# Patient Record
Sex: MALE | Race: WHITE | NOT HISPANIC OR LATINO | Employment: FULL TIME | URBAN - NONMETROPOLITAN AREA
[De-identification: names, ages, dates, MRNs, and addresses within clinical notes are randomized per-mention and may not be internally consistent; named-entity substitution may affect disease eponyms.]

---

## 2023-09-14 ENCOUNTER — HOSPITAL ENCOUNTER (EMERGENCY)
Facility: OTHER | Age: 58
Discharge: HOME | End: 2023-09-14
Attending: FAMILY MEDICINE | Admitting: FAMILY MEDICINE

## 2023-09-14 VITALS
TEMPERATURE: 98.2 F | SYSTOLIC BLOOD PRESSURE: 164 MMHG | WEIGHT: 210 LBS | BODY MASS INDEX: 30.06 KG/M2 | RESPIRATION RATE: 16 BRPM | HEART RATE: 81 BPM | HEIGHT: 70 IN | OXYGEN SATURATION: 97 % | DIASTOLIC BLOOD PRESSURE: 93 MMHG

## 2023-09-14 DIAGNOSIS — S61.411A LACERATION OF RIGHT HAND WITHOUT FOREIGN BODY, INITIAL ENCOUNTER: ICD-10-CM

## 2023-09-14 PROCEDURE — 99282 EMERGENCY DEPT VISIT SF MDM: CPT

## 2023-09-14 PROCEDURE — 12002 RPR S/N/AX/GEN/TRNK2.6-7.5CM: CPT

## 2023-09-14 PROCEDURE — 12002 RPR S/N/AX/GEN/TRNK2.6-7.5CM: CPT | Performed by: FAMILY MEDICINE

## 2023-09-14 RX ORDER — LIDOCAINE HYDROCHLORIDE AND EPINEPHRINE 10; 10 MG/ML; UG/ML
5 INJECTION, SOLUTION INFILTRATION; PERINEURAL ONCE
Status: DISCONTINUED | OUTPATIENT
Start: 2023-09-14 | End: 2023-09-14 | Stop reason: HOSPADM

## 2023-09-14 NOTE — ED TRIAGE NOTES
Pt arrived to ER after cutting his right hand on a piece of galvanized metal in the woods.  States UTD on tetanus. Pt states cut is approximately 1/2 in deep and 3 inches long.     Triage Assessment       Row Name 09/14/23 5220       Triage Assessment (Adult)    Airway WDL WDL       Respiratory WDL    Respiratory WDL WDL       Skin Circulation/Temperature WDL    Skin Circulation/Temperature WDL WDL       Cardiac WDL    Cardiac WDL WDL       Peripheral/Neurovascular WDL    Peripheral Neurovascular WDL WDL       Cognitive/Neuro/Behavioral WDL    Cognitive/Neuro/Behavioral WDL WDL

## 2023-09-15 NOTE — DISCHARGE INSTRUCTIONS
Stitches out in 7-10 days  Keep the area clean and dry.  Okay to put a Band-Aid over the top.  Do not submerge in dirty water such as a lake or pool or hot tub.  Okay to take a shower as usual

## 2023-09-15 NOTE — ED PROVIDER NOTES
"  History     Chief Complaint   Patient presents with    Laceration     Right hand     HPI  Chavo Fine is a 58 year old male who scented for laceration of right hand.  He was working with a metal blade today cutting down trees.  It was a new blade.  He denies any contamination or concern related to foreign body.  It slipped.  He thinks that it is about 3 inches long and 1/2 inch deep.  He wrapped it in gauze and came in.  He is accompanied by his son.    Allergies:  No Known Allergies    Problem List:    There are no problems to display for this patient.       Past Medical History:    No past medical history on file.    Past Surgical History:    No past surgical history on file.    Family History:    No family history on file.    Social History:  Marital Status:   [2]        Medications:    No current outpatient medications on file.        Review of Systems    Physical Exam   BP: (!) 164/93  Pulse: 81  Temp: 98.2  F (36.8  C)  Resp: 16  Height: 177.8 cm (5' 10\")  Weight: 95.3 kg (210 lb)  SpO2: 97 %      Physical Exam  Vitals and nursing note reviewed.   Constitutional:       Appearance: Normal appearance.   Skin:     General: Skin is warm and dry.      Comments: Is a laceration of the palm of the right hand that extends from the medial aspect of the wrist up towards the thumb diagonally.  It is approximately 3-1/2 inches long and 1/2 inch deep.  7 stitches were placed.   Neurological:      Mental Status: He is alert.         ED Course              ED Course as of 09/14/23 1953   Thu Sep 14, 2023   1919 Clean laceration of the right palm.  He is right-handed.     [facility]    -Laceration Repair    Date/Time: 9/14/2023 7:53 PM    Performed by: Betzy Goodwin DO  Authorized by: Betzy Goodwin DO    Risks, benefits and alternatives discussed.      ANESTHESIA (see MAR for exact dosages):     Anesthesia method:  Local infiltration    Local anesthetic:  Lidocaine 1% WITH " epi  LACERATION DETAILS     Location:  Hand    Hand location:  R palm    Length (cm):  3    Depth (mm):  8    REPAIR TYPE:     Repair type:  Simple    EXPLORATION:     Hemostasis achieved with:  Epinephrine and direct pressure    Wound exploration: wound explored through full range of motion      Wound extent: no foreign body, no signs of injury, no nerve damage, no tendon damage and no underlying fracture      Contaminated: no      TREATMENT:     Area cleansed with:  Saline    Amount of cleaning:  Standard    Irrigation solution:  Sterile saline    Irrigation method:  Syringe and pressure wash    Visualized foreign bodies/material removed: no      SKIN REPAIR     Repair method:  Sutures    Suture size:  4-0    Suture material:  Nylon    Suture technique:  Simple interrupted    Number of sutures:  7    APPROXIMATION     Approximation:  Close    POST-PROCEDURE DETAILS     Dressing:  Adhesive bandage      PROCEDURE    Patient Tolerance:  Patient tolerated the procedure well with no immediate complications                Critical Care time:  none               No results found for this or any previous visit (from the past 24 hour(s)).    Medications   lidocaine 1% with EPINEPHrine 1:100,000 injection 5 mL (has no administration in time range)       Assessments & Plan (with Medical Decision Making)     I have reviewed the nursing notes.    I have reviewed the findings, diagnosis, plan and need for follow up with the patient.           Medical Decision Making  The patient's presentation was of straightforward complexity (a clearly self-limited or minor problem).    The patient's evaluation involved:  history and exam without other MDM data elements    The patient's management necessitated only low risk treatment.        New Prescriptions    No medications on file       Final diagnoses:   Laceration of right hand without foreign body, initial encounter   7 nylon sutures placed.  Sutures out in about 10 days.  Discussed  symptoms to look for in regards to infection.  Area was cleaned no foreign bodies visualized.  All questions answered.  He tolerated the procedure well.    9/14/2023   [facility] AND [facility]       Betzy Goodwin DO  09/14/23 2054

## (undated) RX ORDER — LIDOCAINE HCL/EPINEPHRINE/PF 2%-1:200K
VIAL (ML) INJECTION
Status: DISPENSED
Start: 2023-09-14